# Patient Record
Sex: MALE | Race: WHITE
[De-identification: names, ages, dates, MRNs, and addresses within clinical notes are randomized per-mention and may not be internally consistent; named-entity substitution may affect disease eponyms.]

---

## 2018-03-28 ENCOUNTER — HOSPITAL ENCOUNTER (OUTPATIENT)
Dept: HOSPITAL 62 - OD | Age: 23
End: 2018-03-28
Attending: OTOLARYNGOLOGY
Payer: OTHER GOVERNMENT

## 2018-03-28 DIAGNOSIS — J30.9: Primary | ICD-10-CM

## 2018-03-28 PROCEDURE — 36415 COLL VENOUS BLD VENIPUNCTURE: CPT

## 2018-03-28 PROCEDURE — 82785 ASSAY OF IGE: CPT

## 2018-03-28 PROCEDURE — 86003 ALLG SPEC IGE CRUDE XTRC EA: CPT

## 2018-04-03 ENCOUNTER — HOSPITAL ENCOUNTER (OUTPATIENT)
Dept: HOSPITAL 62 - SC | Age: 23
Discharge: HOME | End: 2018-04-03
Attending: OTOLARYNGOLOGY
Payer: OTHER GOVERNMENT

## 2018-04-03 DIAGNOSIS — J01.91: ICD-10-CM

## 2018-04-03 DIAGNOSIS — J34.3: ICD-10-CM

## 2018-04-03 DIAGNOSIS — J34.2: Primary | ICD-10-CM

## 2018-04-03 DIAGNOSIS — J32.8: ICD-10-CM

## 2018-04-03 DIAGNOSIS — Z88.1: ICD-10-CM

## 2018-04-03 DIAGNOSIS — J34.89: ICD-10-CM

## 2018-04-03 DIAGNOSIS — R06.09: ICD-10-CM

## 2018-04-03 DIAGNOSIS — M95.0: ICD-10-CM

## 2018-04-03 PROCEDURE — 0NRB4JZ REPLACEMENT OF NASAL BONE WITH SYNTHETIC SUBSTITUTE, PERCUTANEOUS ENDOSCOPIC APPROACH: ICD-10-PCS | Performed by: OTOLARYNGOLOGY

## 2018-04-03 PROCEDURE — 09TL8ZZ RESECTION OF NASAL TURBINATE, VIA NATURAL OR ARTIFICIAL OPENING ENDOSCOPIC: ICD-10-PCS | Performed by: OTOLARYNGOLOGY

## 2018-04-03 PROCEDURE — 30420 RECONSTRUCTION OF NOSE: CPT

## 2018-04-03 PROCEDURE — 30140 RESECT INFERIOR TURBINATE: CPT

## 2018-04-03 PROCEDURE — 31296 NSL/SINS NDSC SURG FRNT SINS: CPT

## 2018-04-03 PROCEDURE — 31256 EXPLORATION MAXILLARY SINUS: CPT

## 2018-04-03 PROCEDURE — 099R4ZZ DRAINAGE OF LEFT MAXILLARY SINUS, PERCUTANEOUS ENDOSCOPIC APPROACH: ICD-10-PCS | Performed by: OTOLARYNGOLOGY

## 2018-04-04 NOTE — [facility]ARE OPERATIVE REPORT E
[facility]



NAME: SURESH BARNARD

MRN: [id number]

AGE: 23Y

DATE OF SURGERY: 04/04/2018



PREOPERATIVE DIAGNOSES:

1.   NASAL SEPTAL DEVIATION, ACQUIRED.

2.   NASAL DEFORMITIES, ACQUIRED.

3.   CHRONIC NASAL DYSPNEA.

4.   BILATERAL TURBINATE HYPERTROPHY.

5.   ACUTE RECURRENT AND CHRONIC SINUSITIS.



POSTOPERATIVE DIAGNOSES:

1.   NASAL SEPTAL DEVIATION, ACQUIRED.

2.   NASAL DEFORMITIES, ACQUIRED.

3.   CHRONIC NASAL DYSPNEA.

4.   BILATERAL TURBINATE HYPERTROPHY.

5.   ACUTE RECURRENT AND CHRONIC SINUSITIS.



OPERATION PERFORMED:

1.   Image guidance functional endoscopic sinus surgery.

2.   Bilateral maxillary antrostomies without tissue removal and with bilateral

     transnasal rigid surgical endoscopy.

3.   Bilateral frontal sinus balloon sinuplasty with bilateral transnasal rigid

     surgical endoscopy.

4.   Bilateral middle turbinate reductions.

5.   Endonasal/closed septorhinoplasty addressing the bony pyramid component.

6.   Bilateral inferior turbinate reduction.



SURGEON:

NARDA GROVER D.O.



ANESTHESIA:

General endotracheal tube.



ANESTHESIA STAFF:

Keaton Landon CRNA



ESTIMATED BLOOD LOSS:

150 mL



FLUIDS:

1400 mL



COMPLICATIONS:

None.



DRAINS:

None.



SPONGE COUNT:

Verified.



MATERIALS FORWARDED AS SPECIMEN:

None.



FINDINGS:

1.   Moderate to severe right nasal septal deviation involving bone and

     cartilage, with maxillary crest spur and septal spur noted.

2.   Left external nasal deviation.

3.   Bilateral middle and inferior turbinate hypertrophy.

4.   No sinonasal polyps noted.



INDICATIONS:

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

evaluated in the [facility] office.  The patient had been

returned for, and he complained of, a history of chronic nasal dyspnea over

the years.  The patient reports a history of nasal trauma with resulting

nasal deformities.  The patient also reports a history of acute recurrent

and chronic sinusitis symptoms over the years.  There was extensive

discussion held with the patient with clinic endoscopy and CT sinus imaging

also completed.  Recommendation and plan was made to proceed with an

endonasal/closed septorhinoplasty with the bony pyramid being addressed

with medial and lateral osteotomies, inferior and middle turbinate

reductions, image guidance functional endoscopic sinus surgery with

bilateral maxillary antrostomies and bilateral balloon frontal sinuplasty. 

The procedures and all of their risks and complications were all discussed

in detail with the patient.  He voiced an understanding of the described

surgical plan, agreed to proceed, and consent was obtained.



PROCEDURE:

The patient was taken to the main operating room and placed on the

operating room table in the supine position.  Appropriate monitors were

placed.  Using mask and IV access, general anesthesia was induced.  The

patient was then transorally intubated without difficulty.  At this point

the patient was positioned and prepped for nasal and sinus surgery.  The

patient underwent a nasal examination with injection of local anesthetic

with epinephrine to establish a nasal block.  The patient had 2

Afrin-soaked neuro patties placed per nasal passage.  The imaging guidance

system was set up and tested appropriately before beginning the case.  The

patient was then prepped and draped in the usual fashion for sinus and

nasal surgery.  The Afrin-soaked neuro patties were removed.



The patient underwent a hemitransection incision with elevation in the

mucoperichondrial and periosteal flaps without difficulty.  The bony

cartilaginous junction was identified and divided, with the most deviated

portions of septal cartilage and bone being removed.  The cartilaginous

septum was mobilized from the anterior nasal spine and maxillary crest. 

With the use of a V chisel and nasal surgical instruments, the maxillary

crest and septal spur were removed without difficulty.  There was a greater

than 1.5 x 1.5 cm, cartilaginous L strut that was preserved.



At this point the turbinate bipolar wand was used to make 2 passes in each

inferior turbinate.  The anterior aspect of each inferior turbinate was

entered with a pair of Gavin scissors, followed by elevation of tissue in

the submucosal plane with a Purdin elevator.  At this point the turbinate

microdebrider system at a setting of 1500 RPM was used to perform submucous

resection bilateral.  The Cesar elevator was used to outfracture each

inferior turbinate.  The excessive/redundant mucosa was trimmed, and the

margins were reapproximated with chromic suture.



The image guidance functional endoscopic sinus surgery portion of the case

was performed in the following manner.  With use of bilateral transnasal

rigid surgical endoscopy and sinus surgical instrumentations, including

microdebrider system at a setting of 3000 RPM, the bilateral maxillary

antrostomies were performed without difficulty.  Findings were as noted

above.  The balloon frontal sinus sinuplasty system was used at each

frontal sinus recess area and was inflated to 12 atmospheres at 2 different

positions bilateral.  Once complete, the sinuplasty system was withdrawn. 

As a part of performing the maxillary antrostomies, the anterior portion of

the middle turbinates were reduced.  Prior to this, a straight Linda clamp

was used to create controlled fractures in each middle turbinate.



At this point the rhinoplasty portion of the case was addressed, which

focused on the bony nasal pyramid.  Medial and lateral osteotomies were

performed along with dorsal rasp work.  Subperiosteal tunnels were created

just superior to the takeoff of the inferior turbinates for the lateral

osteotomies, which were performed without difficulty.  There were

intercartilaginous incisions performed with elevation of the skin

soft-tissue envelope in a subperiosteal plane.  At this point, medial

osteotomies were performed without difficulty with mobilization of the bony

nasal sidewalls.  There was also dorsal rasp work that was performed.



At this point the septal cartilage was secured in the midline at the

anterior nasal spine using 5-0 clear nylon suture.  Once complete the nose

was thoroughly irrigated and suctioned, with adequate hemostasis noted. 

The cartilage that had been previously removed was placed back between the

mucosal flaps and banked.  There was septal cartilage that was also

fashioned into a right dorsal onlay graft, and this was secured in position

with a through and through and 6-0 Prolene suture.  At this point, all

incisions were reapproximated with chromic suture.  Chromic suture was also

used to repair a right septal mucosal rent in the area where the maxillary

crest spur had been.  At this point there was 1 Azar silicon nasal splint

with bacitracin ointment that was placed per side.  These were secured at

the caudal aspect with 4-0 Prolene suture.  The patient's nose was then

cleaned and dried, followed by placement of Mastisol, Steri-Strips, and a

[place] aluminum nasal splint.



Once complete, the patient was returned to the anesthesia staff and was

allowed to emerge from general anesthesia.  The patient was extubated in

the main operating room, and was then transported to the postanesthesia

recovery unit in stable condition.  There were no complications.





DICTATING PHYSICIAN: NARDA GROVER D.O.



[id number]              DT: 04/04/2018 2237

PHY#: [id number]         DD: 04/04/2018 2229

ID:   [id number]               JOB#: [id number]       ACCT: [id number]



cc:NARDA GROVER D.O.

>